# Patient Record
Sex: FEMALE | Race: WHITE | Employment: FULL TIME | URBAN - METROPOLITAN AREA
[De-identification: names, ages, dates, MRNs, and addresses within clinical notes are randomized per-mention and may not be internally consistent; named-entity substitution may affect disease eponyms.]

---

## 2020-02-05 NOTE — PROGRESS NOTES
HPI:    Patient ID: Dinorah Beckwith is a 30year old female. Presents to the office for the first time to establish care    Has longstanding history of irritable bowel syndrome--constipation variety.   She has seen GI specialist in [place] who recommend and wheezing. Cardiovascular: Negative for chest pain, palpitations and leg swelling. Gastrointestinal: Positive for constipation. Negative for abdominal pain, blood in stool, nausea and vomiting.    Endocrine: Negative for cold intolerance and heat in JVD present. No thyromegaly present. Cardiovascular: Normal rate, regular rhythm, normal heart sounds and intact distal pulses. No murmur heard. Pulmonary/Chest: Effort normal and breath sounds normal. No respiratory distress. She has no wheezes.  She 100 MG Oral Tab 90 tablet 3     Sig: Take 1 tablet (100 mg total) by mouth daily.        Imaging & Referrals:  None       NC#[id number] No

## 2020-03-16 NOTE — TELEPHONE ENCOUNTER
Spoke to patient who prefers to be seen today, but cannot get in until 6 pm. Scheduled with Dr. Lissa Koo at 1800.

## 2020-03-16 NOTE — TELEPHONE ENCOUNTER
Spoke to [facility]. She received an email about 10 minutes ago that one of her coworkers tested positive for COVID 19  Patient states she has been working from home since Thursday.   She says the email said they would reach out to people they thought had close c

## 2020-03-16 NOTE — TELEPHONE ENCOUNTER
Pt does not need to come to office. Continue to monitor temp 1-2 times daily. Stay home as much as possible. Practise good hygeine and hand washing.   Call if she should develop cough causing sob or significant distress or fevers freater than 100.4

## 2020-03-16 NOTE — TELEPHONE ENCOUNTER
Received notification from her employer that one of her coworkers tested positive for covid 23   Transferred call to triage

## 2020-03-16 NOTE — TELEPHONE ENCOUNTER
OLIVER to verify she received voice message. Left detailed message reiterating Dr Mcrae Reveal message. Patient does not need to come in today when she calls back.

## 2020-03-16 NOTE — TELEPHONE ENCOUNTER
To Dr. Judith Clifford----    Patient calling reports since Wednesday she has had a cough, runny nose and sternal pain. Reports cough has improved, but her sternal pain has been ongoing. Patient does reports she has GERD and was recently on abx for GYNE issue.      P

## 2020-03-16 NOTE — TELEPHONE ENCOUNTER
On call; pt reports chest pain x 4d; located in center of chest on sternum; no SOB; on pantoprazole 40 mg po qD; Imp- possible costochondritis;  Rec- ibuprofen 600 mg po q6hrs prn; pt called; advise RTC if sxs persist

## 2020-03-16 NOTE — TELEPHONE ENCOUNTER
Patient calling regarding her appointment tomorrow with Dr. Jordan Smith, calling to move appointment to earlier tomorrow. Having chest pain in sternum area and slight running nose. No cough, no fever, no shortness of pain. [place] beginning of February.   Reginaldo Cano

## 2020-03-16 NOTE — TELEPHONE ENCOUNTER
Patient called back. She did get Daniel's message. The appt for tonight was cancelled. She has more questions. She checked her temp and it was 97.1. She would like to talk to the doctor about her chest pain.  She says it is a constant pain down the cent

## 2020-03-17 NOTE — TELEPHONE ENCOUNTER
To Dr. Nirali Rodrigues - see below - pt reports numbness all over chest but mostly on left side/arm. Will desist for 10 minutes and then return - been almost constant. Pt does sleep on left side. Denies fever/chills/SOB/cough.

## 2020-03-17 NOTE — TELEPHONE ENCOUNTER
Patient left a voicemail,   She was told yesterday she couldn't be seen because she has been exposed to the corona virus    Patient woke this morning with new symptoms that she is alarmed about  She has pain in her chest and the left side is numb    She ha

## 2020-03-17 NOTE — PROGRESS NOTES
HPI:    Patient ID: Bia Aparicio is a 30year old female. 1 week ago, patient had 1 day of a dry cough. She denied any fevers, wheezing or shortness of breath.   However, the next day, patient noted discomfort anterior chest--pain was worse with certai MCG Oral Tab Take  by mouth.      • metRONIDAZOLE 0.75 % Vaginal Gel        Allergies:No Known Allergies   PHYSICAL EXAM:   /66   Pulse 84   Temp 98.4 °F (36.9 °C)   Ht 5' 7\" (1.702 m)   Wt 111 lb (50.3 kg)   LMP 03/16/2020 (Exact Date)   SpO2 96% evaluation. Patient understands and agrees with plan. No orders of the defined types were placed in this encounter.       Meds This Visit:  Requested Prescriptions     Signed Prescriptions Disp Refills   • Meloxicam 15 MG Oral Tab 30 tablet 0     Sig: Teofilo Guerrero

## 2020-03-17 NOTE — TELEPHONE ENCOUNTER
From: Aliya Armenta  To: Albaro Angulo MD  Sent: 3/17/2020 11:23 AM CDT  Subject: Non-Urgent Medical Question    Hi Dr. Ren Devlin,  I tried to call but have been on hold for some time.  I called yesterday and was told I couldn't be seen because a coworker ha

## 2020-04-08 NOTE — TELEPHONE ENCOUNTER
Pt given #30 at [address] [place] [address] 3/17 for sternal pain.      Mychart to patient for update

## 2020-05-01 NOTE — TELEPHONE ENCOUNTER
From: Sarah Chapman  To: Claudine Leventhal, MD  Sent: 5/1/2020 9:07 AM CDT  Subject: Non-Urgent Medical Question    Hi Dr. Braulio Dill! I hope all is well!  I just called the office to make an appointment for Monday at 9:20 bc I couldn't make it into the office

## 2020-05-06 NOTE — PROGRESS NOTES
HPI:    Patient ID: Gloria Wiseman is a 30year old woman who is contacted today for a virtual video office visit. Virtual Video Check-In      Gloria Wiseman verbally consents to a Virtual/video check-In visit on 5/6/2020.     Patient understands and acc works very well. She only takes it on an as needed basis due to the high potency. Burning dyspepsia concern has recently worsened. She describes awakening every morning with this symptom as below.     Burning dyspepsia symptom is most severe in the mor transducer. FINDINGS:    The pancreas is partially visualized and grossly unremarkable. The liver is normal in size and homogeneous in echogenicity. There is no focal liver lesion. The portal vein demonstrates hepatopetal flow.       The common zoltan lesions. Lung bases: Unremarkable. Liver: Unremarkable. Gallbladder and bile ducts: Unremarkable. Spleen: Unremarkable. Pancreas: Unremarkable. Adrenals: Unremarkable. Kidneys and ureters: Unremarkable. Bowel: Unremarkable. Moderate stool burden. Etiologies would be limited to NSAID/meloxicam use or H. pylori infection. No regular medications. · Continue PPI medication for now; new prescription sent in. · Consideration for stepping down to H2 blocker in the future.   · We discussed options for fu detailed in the plan of care above.”       ES#[id number]

## 2020-05-06 NOTE — TELEPHONE ENCOUNTER
Current Outpatient Medications   Medication Sig Dispense Refill   • linaCLOtide (LINZESS) 290 MCG Oral Cap Take 290 mcg by mouth every morning before breakfast.         Not covered-  call [phone]

## 2020-05-07 NOTE — TELEPHONE ENCOUNTER
[employer] below contacted , spoke to Joel Hampton to determine if truly not covered or just needs prior auth. States Linzess is non-formulary/non-covered, but might be able to try for coverage exception. Trulance is covered.  Please advise which you pref

## 2020-05-07 NOTE — TELEPHONE ENCOUNTER
I spoke to Eloisa Vale at [facility]--states they just received Trulance rx and it is in process. They will let us know. Kaye

## 2020-05-07 NOTE — TELEPHONE ENCOUNTER
Thank you Saumya Abdullahi. Ms. Sybil Burns did recently change insurance. Let's try the Trulance and then request Calli Rodriguez if that does not work for her. They are very similar. Rx sent to [facility].

## 2020-05-13 NOTE — TELEPHONE ENCOUNTER
CBLM to schedule procedure. Please transfer to [facility] at ext [phone] for scheduling.

## 2020-05-13 NOTE — TELEPHONE ENCOUNTER
GI schedulers, please call Sylvia Salinas to schedule EGD (stomach endoscopy) exam at [facility])/ [facility]    This patient IS appropriate for the [facility] endoscopy center.     BMI Readings from Last 1 Encounters:  03/17/20 : 17.39 k

## 2020-05-14 NOTE — TELEPHONE ENCOUNTER
Received letter from [facility] pharmacy stating Trulance covered under plan, no PA required but that it must go through C/Baltazar Torres [address] contacted [address] [place] [address] at [phone], and spoke to Pharmacist [place] and gave trulance rx.  She will forward to their insuran

## 2020-05-15 NOTE — TELEPHONE ENCOUNTER
Scheduled for:  EGD - 39417  Provider Name:  Dr. Coni Lyman  Date:  6/4/20  Location:  [facility]  Sedation:  MAC  Time:  Approx 7:45 am (pt is aware that [facility] will call with arrival time)  Prep:  NPO after midnight, Prep instructions were given to pt over the phone,

## 2020-05-19 RX ORDER — LINACLOTIDE 145 UG/1
CAPSULE, GELATIN COATED ORAL
Qty: 90 CAPSULE | Refills: 3 | OUTPATIENT
Start: 2020-05-19

## 2020-05-27 NOTE — TELEPHONE ENCOUNTER
Patient called in to get the CPT code for a procedure on 6/4 with Dr. Diann Guerrero.  Please advise

## 2020-06-01 NOTE — TELEPHONE ENCOUNTER
I left a message for the pt with the CPT code for her procedure on 06/04/2020.     [place] code [place] 70558

## 2020-06-04 NOTE — PROCEDURES
[facility]      EGD PROCEDURE REPORT    DATE OF PROCEDURE:  6/4/2020    PCP: Deedee Tavarez     PREOPERATIVE DIAGNOSIS:  Nausea, GERD, dyspepsia     POSTOPERATIVE DIAGNOSIS:  See impression. SURGEON:  Ezequiel Mccullough M.D correlate with recent symptoms.

## 2020-06-29 NOTE — TELEPHONE ENCOUNTER
Lab letter mailed out to patient per . Emir Spaulding MD  [facility] Clinical Staff             GI RNs - 1.  Please print and mail this letter to patient

## 2020-12-23 PROBLEM — M25.532 LEFT WRIST PAIN: Status: ACTIVE | Noted: 2020-12-23

## 2020-12-23 NOTE — TELEPHONE ENCOUNTER
Current refill request refused due to refill is either a duplicate request or has active refills at the pharmacy. Check previous templates.     Requested Prescriptions     Refused Prescriptions Disp Refills   • MELOXICAM 15 MG Oral Tab [Pharmacy Med Name:

## 2020-12-23 NOTE — PROGRESS NOTES
HPI:    Patient ID: Miranda Mota is a 30year old female. Presents with c/o left wrist pain x2 to 3 weeks. Patient denies any specific injury or fall.   However she does work on social media consistently throughout the day using computer and texting on swelling. Phalen sign is negative. There is some discomfort to palpation over dorsal surface of left wrist.  Extending hand causes more discomfort in this area.               ASSESSMENT/PLAN:   Left wrist pain  (primary encounter diagnosis)  Seasonal gurmeet

## 2021-01-04 NOTE — TELEPHONE ENCOUNTER
Continue with soft wrist splint at night. We will also need to get left wrist x-ray and occupational therapy evaluation--I have placed orders for this to be done.

## 2021-01-04 NOTE — TELEPHONE ENCOUNTER
Per 12/23 ov: If symptoms persist, will then get occupational therapy evaluation.     OT order pended

## 2021-01-04 NOTE — TELEPHONE ENCOUNTER
From: Gloria Wiseman  To: Eusebio Donnelly MD  Sent: 1/4/2021 11:28 AM CST  Subject: Visit Elder Carrero,    I hope all is well!  I'm reaching out because it's been almost two weeks since I came in for my wrist pain, and my pain hasn't i

## 2021-02-01 NOTE — PROGRESS NOTES
HPI:    Patient ID: Citlali Bay is a 30year old female. Presents with c/o left sided neck pain that started approximately 2 to 3 days ago. She denies any trauma.   She feels a tightness in the left side that sometimes radiates down the left upper ext by mouth every 8 (eight) hours as needed for Nausea.  (Patient not taking: Reported on 2/1/2021 ) 30 tablet 2     Allergies:No Known Allergies   PHYSICAL EXAM:   /60   Pulse 83   Temp 98.2 °F (36.8 °C)   Ht 5' 7\" (1.702 m)   Wt 110 lb (49.9 kg)   LMP prescriptions requested or ordered in this encounter       Imaging & Referrals:  None       [id number]

## 2021-02-09 NOTE — TELEPHONE ENCOUNTER
From: Harper Quarles  To:  Rachel Parker MD  Sent: 2021 3:30 PM CST  Subject: Referral Request    Hi Dr Jennifer Gill and Team,    Can you please send me OT/PT referral to the followin[address] [place] [address]  Mary Rees, Jeison Topete  ([phone])

## 2021-02-17 NOTE — TELEPHONE ENCOUNTER
From: Nay Blount  To: Arina Rangel MD  Sent: 2/16/2021 4:57 PM CST  Subject: Referral Request    Hi again! I actually need the OT request sent to a new location because the location I had asked it to be sent to doesn't have an OT just PT.     Please

## 2021-02-17 NOTE — TELEPHONE ENCOUNTER
OT and PT orders faxed to Suzette  at [phone] recieved  [facility] message sent to patient that this was done and that PT/OT will evaluate and can tell her how long it might be needed

## 2021-02-25 NOTE — TELEPHONE ENCOUNTER
S/w patient. She reports soreness and a feeling of \"irritation\" to posterior head. Onset: yesterday. Soreness still present. + tender to touch. Denies h/a. No changes in vision. She also reports new onset of lightheadedness that started this week.  She's

## 2021-02-25 NOTE — TELEPHONE ENCOUNTER
From: Mack Bey  To:  Mariama Wing MD  Sent: 2/25/2021 9:18 AM CST  Subject: Non-Urgent Medical Question    Hi Dr Tiffani Arias,    I am reaching out because yesterday afternoon I started having so pain focused around the top back of my scalp/head that hurt

## 2021-02-25 NOTE — TELEPHONE ENCOUNTER
[place], should she go to urgent care, not able to get to our office today  Pt still has headache in back, also now hurts at the front of her head  Doesn't seem like sinus headache  Tasked to nursing

## 2021-02-25 NOTE — TELEPHONE ENCOUNTER
Spoke to patient who reports pain to touch/irritation is now also in the front of her head, center of forehead. No dizziness or blurred vision.  Would not be able to come in today until after 5pm. Discussed availability of appts with alternate physician jose raul

## 2021-02-25 NOTE — TELEPHONE ENCOUNTER
Patient has appt scheduled for tomorrow at 10am with Dr. Luz Toussaint.      FYI to Dr. Luz Toussaint

## 2021-02-25 NOTE — TELEPHONE ENCOUNTER
Pt went to [facility] today    Scheduled follow up for tomorrow morning with Dr Balta Baltazar at 8 am/ Dr Clive Porras did not have any available appts tomorrow morning

## 2021-02-26 NOTE — PROGRESS NOTES
Chief Complaint:   Patient presents with:  Urgent Care F/u: 4/10 headaches since Wednesday, wakes up with pain in the back of her head. Eye pain. Sometimes feels lightheaded if she turns her head quickly.  Yesterday she had a headache in the front of her he 1 or 2      Comment: 2 drinks /wk    Drug use: Never    Family History:  Family History   Problem Relation Age of Onset   • Cancer Mother    • Breast Cancer Mother    • Diabetes Sister    • Diabetes Other         PAunt - juvenile   • Asthma Other         M Wheezing, Shortness of breath  Gastrointestinal: Nausea, Vomiting, Diarrhea, Constipation, Pain, Heartburn, Dysphagia, Bloody stools, Tarry stools  Genitourinary: Dysmenorrhea, Dysuria, Urinary Frequency, Hematuria, Urinary Incontinence, Urgency,  Flank Pa alk phos 29  - CBC: Hb 11.9  - TSH: WNL      ASSESSMENT AND PLAN:     Ms. Truman Vickers is a 30year old female PMHx acne, GERD, IBS, and seasonal allergies who presents today for an acute visit with head pain.      Headache  Sinus pressure  Suspect that she is de diet, exercise, review of available labs and radiology reports, and completing documentation.        Kian Gilliland, 02/26/21, 3:47 PM

## 2021-02-26 NOTE — TELEPHONE ENCOUNTER
Please notify the patient that I reviewed her blood work from today:    –BMP: Within normal limits, potassium looks good    –CBC: Hemoglobin is 11.8 which is stable from the last couple blood draws.     No other new recommendations other than those discusse

## 2021-02-26 NOTE — TELEPHONE ENCOUNTER
Forms from [employer] initial evaluation signed by MD and faxed back to [employer] (f: [phone])  Sent to scanning.

## 2021-02-26 NOTE — PATIENT INSTRUCTIONS
You were seen in clinic for follow-up after urgent care visit. Your symptoms are likely related to be developing sinus infection. As discussed, most sinus infections are viral and do not require antibiotics.   Continue taking your Flonase and Zyrtec, and

## 2021-03-01 NOTE — TELEPHONE ENCOUNTER
From: Delbert Gee  To: Vanesa Lora MD  Sent: 3/1/2021 1:56 PM CST  Subject: Visit Chris Strickland,    Following our visit on Friday, I have been taking the Sudafed and refraining on using the spironolactone.  I went to [facility] on [address]

## 2021-03-04 NOTE — TELEPHONE ENCOUNTER
From: Louise Oshea  To: Patsy Lawton MD  Sent: 3/4/2021 2:19 PM CST  Subject: Visit Maxwell Solano,    Thank you for getting back to me on resuming the spironolocatone.  I have not resumed yet as I wanted to see if my headaches improv

## 2021-03-12 NOTE — TELEPHONE ENCOUNTER
Patient calling back, still waiting for call back. Would like a call back today, so she could start medications.     Best call back number [phone]

## 2021-03-12 NOTE — TELEPHONE ENCOUNTER
[employer] progress note reviewed and signed by Dr. Gerri Ramey. Faxed back to [employer] at [phone]. Fax confirmation received. Copy to scanning.

## 2021-03-12 NOTE — TELEPHONE ENCOUNTER
From: Army Vick  To: Leeroy Brantley MD  Sent: 3/11/2021 11:36 PM CST  Subject: Visit Follow-up Question    Hi Dr Albertina Moses all is well! A few weeks ago on 2/26 I saw Dr Cj Aguayo as tour schedule was full.  I had gone to an urgent care the day befor

## 2021-03-13 NOTE — TELEPHONE ENCOUNTER
I am sorry I am having a hard time following what the question is here, looks like Efrem Price is answered yes to getting them in during the vaccine despite a sinus infection, and that he would encourage this patient to complete the antibiotics given by Dr. Vladimir Deleon

## 2021-03-16 NOTE — PROGRESS NOTES
HPI:    Patient ID: Yamileth Ramos is a 30year old female. Presents with c/o continued headaches. She describes the headaches as occurring over both eyebrows. However she does not feel congested.   Last week, patient was started on Zyrtec and Flonase w 1-20 MG-MCG(24) Oral Tab      • Clindamycin Phos-Benzoyl Perox 1.2-3.75 % External Gel Onexton 1.2 % (1 % base)-3.75 % topical gel with pump   apply qd     • Ondansetron HCl (ZOFRAN) 4 mg tablet Take 1-2 tablets (4-8 mg total) by mouth every 8 (eight) hour diagnosis)  Nonintractable headache, unspecified chronicity pattern, unspecified headache type    Patient has persistent headaches--? From chronic sinusitis  She has noted minimal improvement with Flonase and Zyrtec--we will have patient continue for now.

## 2021-03-19 NOTE — PROGRESS NOTES
Sarah Chapman is a 30year old female. Patient presents with:  Sinus Problem: recurrent sinus infections    HPI:   For the last 3 weeks she has been experiencing pain in the back of her neck extending up to the top of her head and to the forehead.   This is Normal Inspection - Normal.   Constitutional Normal Overall appearance - Normal.   Head/Face Normal Facial features - Normal. Eyebrows - Normal. Skull - Normal.   Oral/Oropharynx Normal Lips - Normal, Tonsils - Normal, Tongue - Normal    Nasal Normal Exter

## 2021-03-19 NOTE — TELEPHONE ENCOUNTER
Patient is calling to leave a message with Dr Melissa Kelly.  Dr Melissa Kelly referred patient to a \"sinus doctor\" and patient saw the \"sinus doctor\" today and was informed \"everything was normal.\" Patient is now wondering if she should get a CT scan per Dr Nathalie Forbes

## 2021-03-19 NOTE — TELEPHONE ENCOUNTER
Spoke to pt and relayed MD message and instructions. Pt verbalized understanding and agrees with plan.  Per pt, she has had trouble in the past with insurance covering certain imaging so she will call insurance first. Advised to call us back with any questi

## 2021-03-19 NOTE — TELEPHONE ENCOUNTER
I reviewed ENTs evaluation feels patient's symptoms are most likely secondary to musculoskeletal issues causing possible tension headaches. We will get CT scan to complete work-up.   However if this is unremarkable, then I would recommend physical therapy

## 2021-03-24 NOTE — TELEPHONE ENCOUNTER
Called Priyanka from [address] [place] [address] who state she needs Authorization changed to [employer].  Brad Butler from [facility] who will try to change it and call patient and [address]

## 2021-03-24 NOTE — TELEPHONE ENCOUNTER
Priyanka called from [facility] will be having CT done there 3/25/21  Sight needs to be changed in Authorization as pt is going to [address] is in Epic  Please call Adams Or with any questions  Tasked to nursing

## 2021-03-26 NOTE — TELEPHONE ENCOUNTER
CT of brain does not show any abnormalities in brain.   However there are 2 small cysts noted in scalp--1 is occurring over the right frontal bone which is in the area over her eyelid and one in the right temple area--these are very small and I doubt that t

## 2021-03-26 NOTE — TELEPHONE ENCOUNTER
Called patient and relayed DR. MOHAMUD message - verbalized understanding.  Number for DR. Oral Stroud given to patient

## 2021-05-28 NOTE — TELEPHONE ENCOUNTER
----- Message from Rocio Pineda sent at 5/28/2021  8:33 AM CDT -----  Regarding: Non-Urgent Medical Question  Contact: [phone]  Hi Dr Sal Aquino all is well. I am fully vaccinated and on my way to [facility] this morning.  I woke up with a bit of a c

## 2021-05-28 NOTE — TELEPHONE ENCOUNTER
Eloisa message responded. I prescribed Polytrim or ofloxacin if she wears contact lenses. I have not worked with TobraDex before and is used primarily an inflammatory eye conditions.

## 2021-05-28 NOTE — TELEPHONE ENCOUNTER
From: Aliya Armenta  To: Albaro Angulo MD  Sent: 5/28/2021 8:33 AM CDT  Subject: Non-Urgent Medical Question    Hi Dr Flaco Miller all is well. I am fully vaccinated and on my way to [facility] this morning.  I woke up with a bit of a crusty eye on the end

## 2022-07-09 ENCOUNTER — HOSPITAL ENCOUNTER (EMERGENCY)
Facility: HOSPITAL | Age: 27
Discharge: HOME | End: 2022-07-09
Attending: EMERGENCY MEDICINE
Payer: COMMERCIAL

## 2022-07-09 ENCOUNTER — APPOINTMENT (OUTPATIENT)
Dept: ULTRASOUND IMAGING | Facility: HOSPITAL | Age: 27
End: 2022-07-09
Attending: EMERGENCY MEDICINE
Payer: COMMERCIAL

## 2022-07-09 VITALS
WEIGHT: 118 LBS | HEIGHT: 67 IN | TEMPERATURE: 98 F | DIASTOLIC BLOOD PRESSURE: 87 MMHG | BODY MASS INDEX: 18.52 KG/M2 | SYSTOLIC BLOOD PRESSURE: 123 MMHG | HEART RATE: 77 BPM | RESPIRATION RATE: 19 BRPM | OXYGEN SATURATION: 100 %

## 2022-07-09 DIAGNOSIS — N83.201 CYST OF RIGHT OVARY: Primary | ICD-10-CM

## 2022-07-09 LAB
ANION GAP SERPL CALC-SCNC: 7 MMOL/L (ref 0–18)
B-HCG UR QL: NEGATIVE
BASOPHILS # BLD AUTO: 0.05 X10(3) UL (ref 0–0.2)
BASOPHILS NFR BLD AUTO: 0.7 %
BILIRUB UR QL: NEGATIVE
BUN BLD-MCNC: 13 MG/DL (ref 7–18)
BUN/CREAT SERPL: 18.8 (ref 10–20)
CALCIUM BLD-MCNC: 9.3 MG/DL (ref 8.5–10.1)
CHLORIDE SERPL-SCNC: 107 MMOL/L (ref 98–112)
CLARITY UR: CLEAR
CO2 SERPL-SCNC: 25 MMOL/L (ref 21–32)
COLOR UR: YELLOW
CREAT BLD-MCNC: 0.69 MG/DL
DEPRECATED RDW RBC AUTO: 40.5 FL (ref 35.1–46.3)
EOSINOPHIL # BLD AUTO: 0.09 X10(3) UL (ref 0–0.7)
EOSINOPHIL NFR BLD AUTO: 1.3 %
ERYTHROCYTE [DISTWIDTH] IN BLOOD BY AUTOMATED COUNT: 11.7 % (ref 11–15)
GLUCOSE BLD-MCNC: 91 MG/DL (ref 70–99)
GLUCOSE UR-MCNC: NEGATIVE MG/DL
HCT VFR BLD AUTO: 36.9 %
HGB BLD-MCNC: 12.1 G/DL
HGB UR QL STRIP.AUTO: NEGATIVE
IMM GRANULOCYTES # BLD AUTO: 0.01 X10(3) UL (ref 0–1)
IMM GRANULOCYTES NFR BLD: 0.1 %
KETONES UR-MCNC: NEGATIVE MG/DL
LEUKOCYTE ESTERASE UR QL STRIP.AUTO: NEGATIVE
LYMPHOCYTES # BLD AUTO: 2.72 X10(3) UL (ref 1–4)
LYMPHOCYTES NFR BLD AUTO: 37.8 %
MCH RBC QN AUTO: 31.1 PG (ref 26–34)
MCHC RBC AUTO-ENTMCNC: 32.8 G/DL (ref 31–37)
MCV RBC AUTO: 94.9 FL
MONOCYTES # BLD AUTO: 0.47 X10(3) UL (ref 0.1–1)
MONOCYTES NFR BLD AUTO: 6.5 %
NEUTROPHILS # BLD AUTO: 3.85 X10 (3) UL (ref 1.5–7.7)
NEUTROPHILS # BLD AUTO: 3.85 X10(3) UL (ref 1.5–7.7)
NEUTROPHILS NFR BLD AUTO: 53.6 %
NITRITE UR QL STRIP.AUTO: NEGATIVE
OSMOLALITY SERPL CALC.SUM OF ELEC: 288 MOSM/KG (ref 275–295)
PH UR: 7 [PH] (ref 5–8)
PLATELET # BLD AUTO: 379 10(3)UL (ref 150–450)
POTASSIUM SERPL-SCNC: 3.7 MMOL/L (ref 3.5–5.1)
PROT UR-MCNC: NEGATIVE MG/DL
RBC # BLD AUTO: 3.89 X10(6)UL
SODIUM SERPL-SCNC: 139 MMOL/L (ref 136–145)
SP GR UR STRIP: 1.01 (ref 1–1.03)
UROBILINOGEN UR STRIP-ACNC: 0.2
WBC # BLD AUTO: 7.2 X10(3) UL (ref 4–11)

## 2022-07-09 PROCEDURE — 81003 URINALYSIS AUTO W/O SCOPE: CPT | Performed by: EMERGENCY MEDICINE

## 2022-07-09 PROCEDURE — 80048 BASIC METABOLIC PNL TOTAL CA: CPT | Performed by: EMERGENCY MEDICINE

## 2022-07-09 PROCEDURE — 36415 COLL VENOUS BLD VENIPUNCTURE: CPT

## 2022-07-09 PROCEDURE — 99284 EMERGENCY DEPT VISIT MOD MDM: CPT

## 2022-07-09 PROCEDURE — 76856 US EXAM PELVIC COMPLETE: CPT | Performed by: EMERGENCY MEDICINE

## 2022-07-09 PROCEDURE — 85025 COMPLETE CBC W/AUTO DIFF WBC: CPT | Performed by: EMERGENCY MEDICINE

## 2022-07-09 PROCEDURE — 76830 TRANSVAGINAL US NON-OB: CPT | Performed by: EMERGENCY MEDICINE

## 2022-07-09 PROCEDURE — 93975 VASCULAR STUDY: CPT | Performed by: EMERGENCY MEDICINE

## 2022-07-09 PROCEDURE — 81025 URINE PREGNANCY TEST: CPT

## (undated) NOTE — LETTER
Garland Hodgkin, Md  [facility]       03/19/21        Patient: Barrett Odell   YOB: 1995   Date of Visit: 3/19/2021       Dear  Dr. Mack Moctezuma MD,      Thank you for referring Barrett Mikelona to my practice.   Rebekah